# Patient Record
Sex: MALE | Race: WHITE
[De-identification: names, ages, dates, MRNs, and addresses within clinical notes are randomized per-mention and may not be internally consistent; named-entity substitution may affect disease eponyms.]

---

## 2018-03-13 ENCOUNTER — HOSPITAL ENCOUNTER (OUTPATIENT)
Dept: HOSPITAL 8 - CFH | Age: 63
End: 2018-03-13
Attending: NURSE PRACTITIONER
Payer: MEDICARE

## 2018-03-13 ENCOUNTER — HOSPITAL ENCOUNTER (OUTPATIENT)
Dept: HOSPITAL 8 - WOUND | Age: 63
Discharge: HOME | End: 2018-03-13
Attending: NURSE PRACTITIONER
Payer: MEDICARE

## 2018-03-13 DIAGNOSIS — F32.9: ICD-10-CM

## 2018-03-13 DIAGNOSIS — M85.871: ICD-10-CM

## 2018-03-13 DIAGNOSIS — M79.89: ICD-10-CM
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
## 2018-03-13 DIAGNOSIS — L89.510: Primary | ICD-10-CM

## 2018-03-13 DIAGNOSIS — S91.001A: ICD-10-CM

## 2018-03-13 DIAGNOSIS — L97.812: Primary | ICD-10-CM

## 2018-03-13 DIAGNOSIS — E78.5: ICD-10-CM
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
## 2018-03-13 DIAGNOSIS — I10: ICD-10-CM

## 2018-03-13 DIAGNOSIS — F17.210: ICD-10-CM

## 2018-03-13 DIAGNOSIS — Z86.73: ICD-10-CM

## 2018-03-13 DIAGNOSIS — F12.10: ICD-10-CM
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
## 2018-03-13 PROCEDURE — 11042 DBRDMT SUBQ TIS 1ST 20SQCM/<: CPT

## 2018-03-20 ENCOUNTER — HOSPITAL ENCOUNTER (OUTPATIENT)
Dept: HOSPITAL 8 - WOUND | Age: 63
Discharge: HOME | End: 2018-03-20
Attending: NURSE PRACTITIONER
Payer: MEDICARE

## 2018-03-20 DIAGNOSIS — Z86.73: ICD-10-CM

## 2018-03-20 DIAGNOSIS — E78.5: ICD-10-CM

## 2018-03-20 DIAGNOSIS — G25.81: ICD-10-CM

## 2018-03-20 DIAGNOSIS — L89.510: Primary | ICD-10-CM

## 2018-03-20 DIAGNOSIS — F17.210: ICD-10-CM

## 2018-03-20 DIAGNOSIS — G61.82: ICD-10-CM

## 2018-03-20 DIAGNOSIS — F32.9: ICD-10-CM

## 2018-03-20 DIAGNOSIS — F12.10: ICD-10-CM

## 2018-03-20 DIAGNOSIS — I10: ICD-10-CM

## 2018-03-20 PROCEDURE — 11042 DBRDMT SUBQ TIS 1ST 20SQCM/<: CPT

## 2019-04-25 ENCOUNTER — HOSPITAL ENCOUNTER (EMERGENCY)
Dept: HOSPITAL 8 - ED | Age: 64
Discharge: HOME | End: 2019-04-25
Payer: MEDICARE

## 2019-04-25 VITALS — HEIGHT: 71 IN | WEIGHT: 180.78 LBS | BODY MASS INDEX: 25.31 KG/M2

## 2019-04-25 VITALS — SYSTOLIC BLOOD PRESSURE: 114 MMHG | DIASTOLIC BLOOD PRESSURE: 72 MMHG

## 2019-04-25 DIAGNOSIS — G89.11: Primary | ICD-10-CM

## 2019-04-25 DIAGNOSIS — Z86.73: ICD-10-CM

## 2019-04-25 DIAGNOSIS — H57.11: ICD-10-CM

## 2019-04-25 PROCEDURE — 99283 EMERGENCY DEPT VISIT LOW MDM: CPT

## 2020-09-10 ENCOUNTER — HOSPITAL ENCOUNTER (EMERGENCY)
Dept: HOSPITAL 8 - ED | Age: 65
Discharge: HOME | End: 2020-09-10
Payer: MEDICARE

## 2020-09-10 VITALS — SYSTOLIC BLOOD PRESSURE: 121 MMHG | DIASTOLIC BLOOD PRESSURE: 78 MMHG

## 2020-09-10 VITALS — WEIGHT: 180.78 LBS | HEIGHT: 71 IN | BODY MASS INDEX: 25.31 KG/M2

## 2020-09-10 DIAGNOSIS — F64.8: Primary | ICD-10-CM

## 2020-09-10 DIAGNOSIS — R07.9: ICD-10-CM

## 2020-09-10 DIAGNOSIS — I48.91: ICD-10-CM

## 2020-09-10 LAB
ALBUMIN SERPL-MCNC: 3.5 G/DL (ref 3.4–5)
ALP SERPL-CCNC: 62 U/L (ref 45–117)
ALT SERPL-CCNC: 10 U/L (ref 12–78)
ANION GAP SERPL CALC-SCNC: 7 MMOL/L (ref 5–15)
BASOPHILS # BLD AUTO: 0.03 X10^3/UL (ref 0–0.1)
BASOPHILS NFR BLD AUTO: 0 % (ref 0–1)
BILIRUB SERPL-MCNC: 0.3 MG/DL (ref 0.2–1)
CALCIUM SERPL-MCNC: 8.9 MG/DL (ref 8.5–10.1)
CHLORIDE SERPL-SCNC: 103 MMOL/L (ref 98–107)
CREAT SERPL-MCNC: 0.82 MG/DL (ref 0.7–1.3)
EOSINOPHIL # BLD AUTO: 0.16 X10^3/UL (ref 0–0.4)
EOSINOPHIL NFR BLD AUTO: 2 % (ref 1–7)
ERYTHROCYTE [DISTWIDTH] IN BLOOD BY AUTOMATED COUNT: 14.9 % (ref 9.4–14.8)
LYMPHOCYTES # BLD AUTO: 2.42 X10^3/UL (ref 1–3.4)
LYMPHOCYTES NFR BLD AUTO: 25 % (ref 22–44)
MCH RBC QN AUTO: 30 PG (ref 27.5–34.5)
MCHC RBC AUTO-ENTMCNC: 32.5 G/DL (ref 33.2–36.2)
MCV RBC AUTO: 92.2 FL (ref 81–97)
MD: NO
MICROSCOPIC: (no result)
MONOCYTES # BLD AUTO: 0.45 X10^3/UL (ref 0.2–0.8)
MONOCYTES NFR BLD AUTO: 5 % (ref 2–9)
NEUTROPHILS # BLD AUTO: 6.52 X10^3/UL (ref 1.8–6.8)
NEUTROPHILS NFR BLD AUTO: 68 % (ref 42–75)
PLATELET # BLD AUTO: 257 X10^3/UL (ref 130–400)
PMV BLD AUTO: 7.6 FL (ref 7.4–10.4)
PROT SERPL-MCNC: 6.8 G/DL (ref 6.4–8.2)
RBC # BLD AUTO: 5.24 X10^6/UL (ref 4.38–5.82)

## 2020-09-10 PROCEDURE — 93005 ELECTROCARDIOGRAM TRACING: CPT

## 2020-09-10 PROCEDURE — 81003 URINALYSIS AUTO W/O SCOPE: CPT

## 2020-09-10 PROCEDURE — 83605 ASSAY OF LACTIC ACID: CPT

## 2020-09-10 PROCEDURE — 71045 X-RAY EXAM CHEST 1 VIEW: CPT

## 2020-09-10 PROCEDURE — 80053 COMPREHEN METABOLIC PANEL: CPT

## 2020-09-10 PROCEDURE — 36415 COLL VENOUS BLD VENIPUNCTURE: CPT

## 2020-09-10 PROCEDURE — 99285 EMERGENCY DEPT VISIT HI MDM: CPT

## 2020-09-10 PROCEDURE — 85025 COMPLETE CBC W/AUTO DIFF WBC: CPT

## 2020-09-10 PROCEDURE — 84443 ASSAY THYROID STIM HORMONE: CPT

## 2020-09-10 NOTE — NUR
PT UPDATED ON POC.  PT REFUSING TO PROVIDE URINE, "I TOLD THE DR, I DON'T HAVE 
TO GO!"  ERP NOTIFIED.  PCXR COMPLETED, AWAITING LABS.

## 2020-09-10 NOTE — NUR
CALL TO LAB INQUIRING ON CBC DELAY.  EQUIPMENT MALFUCTION RESULTING IN DELAY.  
NOTE TO ERP INFORMING.

## 2020-10-16 ENCOUNTER — HOSPITAL ENCOUNTER (OUTPATIENT)
Dept: HOSPITAL 8 - RAD | Age: 65
Discharge: HOME | End: 2020-10-16
Attending: NURSE PRACTITIONER
Payer: MEDICARE

## 2020-10-16 DIAGNOSIS — R63.4: ICD-10-CM

## 2020-10-16 DIAGNOSIS — I70.0: ICD-10-CM

## 2020-10-16 DIAGNOSIS — M85.88: ICD-10-CM

## 2020-10-16 DIAGNOSIS — I72.3: ICD-10-CM

## 2020-10-16 DIAGNOSIS — M51.36: ICD-10-CM

## 2020-10-16 DIAGNOSIS — K86.89: ICD-10-CM

## 2020-10-16 DIAGNOSIS — K57.30: Primary | ICD-10-CM

## 2020-10-16 PROCEDURE — 74177 CT ABD & PELVIS W/CONTRAST: CPT

## 2020-11-10 ENCOUNTER — HOSPITAL ENCOUNTER (OUTPATIENT)
Dept: HOSPITAL 8 - RAD | Age: 65
Discharge: HOME | End: 2020-11-10
Attending: FAMILY MEDICINE
Payer: MEDICARE

## 2020-11-10 DIAGNOSIS — I67.9: Primary | ICD-10-CM

## 2020-11-10 PROCEDURE — 74230 X-RAY XM SWLNG FUNCJ C+: CPT
